# Patient Record
Sex: FEMALE | Race: WHITE | NOT HISPANIC OR LATINO | URBAN - METROPOLITAN AREA
[De-identification: names, ages, dates, MRNs, and addresses within clinical notes are randomized per-mention and may not be internally consistent; named-entity substitution may affect disease eponyms.]

---

## 2021-08-02 ENCOUNTER — OFFICE VISIT (OUTPATIENT)
Dept: URBAN - METROPOLITAN AREA CLINIC 113 | Facility: CLINIC | Age: 36
End: 2021-08-02
Payer: COMMERCIAL

## 2021-08-02 ENCOUNTER — WEB ENCOUNTER (OUTPATIENT)
Dept: URBAN - METROPOLITAN AREA CLINIC 113 | Facility: CLINIC | Age: 36
End: 2021-08-02

## 2021-08-02 VITALS
BODY MASS INDEX: 21.9 KG/M2 | HEART RATE: 84 BPM | SYSTOLIC BLOOD PRESSURE: 127 MMHG | HEIGHT: 61 IN | DIASTOLIC BLOOD PRESSURE: 79 MMHG | WEIGHT: 116 LBS | TEMPERATURE: 98.4 F

## 2021-08-02 DIAGNOSIS — R93.2 ABNORMAL CT OF LIVER: ICD-10-CM

## 2021-08-02 DIAGNOSIS — R10.13 EPIGASTRIC PAIN: ICD-10-CM

## 2021-08-02 PROCEDURE — 99203 OFFICE O/P NEW LOW 30 MIN: CPT | Performed by: INTERNAL MEDICINE

## 2021-08-02 RX ORDER — OMEPRAZOLE 20 MG/1
1 CAPSULE 30 MINUTES BEFORE MORNING MEAL CAPSULE, DELAYED RELEASE ORAL ONCE A DAY
Status: ACTIVE | COMMUNITY

## 2021-08-02 RX ORDER — OMEPRAZOLE 20 MG/1
1 CAPSULE 30 MINUTES BEFORE MORNING MEAL CAPSULE, DELAYED RELEASE ORAL ONCE A DAY
OUTPATIENT

## 2021-08-02 NOTE — HPI-TODAY'S VISIT:
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 She reports last Tuesday developing acute onset abdominal pain at 9 AM while at work.  The pain was periumbilical, knifelike, constant.  The pain resulted in nausea and episode of nonbloody emesis.  She denies any fevers, chills, change in bowel habits. Due to worsening symptoms she presented to the [facility] emergency department for evaluation.  CT abdomen pelvis with contrast on 7/27/2021 reported no acute findings, nondilated, fluid-filled loops of small bowel questionably secondary to enteritis, hepatic steatosis, hepatic periportal edema and a small amount of pericholecystic edema, 9 mm right hepatic lobe cyst.  An abdominal ultrasound was unremarkable.  Labs showed normal CBC, basic metabolic panel, liver function tests, lipase and nonreactive hepatitis C.  She was discharged to home with recommendations to begin omeprazole 20 mg daily for suspected gastritis, tramadol 50 mg every 6 hours as needed for relief of exacerbations of pain, and ondansetron 4 mg every 8 hours as needed for nausea. She reports that her pain has resolved.  She denies any nausea, vomiting, dysphagia.  She occasionally has some constipation with red blood on the tissue paper.  She reports having a colonoscopy by Dr. Cordon several years ago at [facility] that was reportedly unremarkable except for internal hemorrhoids. She is concerned about the findings on the CT.  There is no family history of liver disease.  She has never had an EGD.

## 2021-08-06 ENCOUNTER — LAB OUTSIDE AN ENCOUNTER (OUTPATIENT)
Dept: URBAN - METROPOLITAN AREA CLINIC 113 | Facility: CLINIC | Age: 36
End: 2021-08-06

## 2021-08-06 ENCOUNTER — TELEPHONE ENCOUNTER (OUTPATIENT)
Dept: URBAN - METROPOLITAN AREA CLINIC 113 | Facility: CLINIC | Age: 36
End: 2021-08-06

## 2021-08-06 PROBLEM — 15634181000119107: Status: ACTIVE | Noted: 2021-08-02

## 2021-08-21 ENCOUNTER — CLAIMS CREATED FROM THE CLAIM WINDOW (OUTPATIENT)
Dept: URBAN - METROPOLITAN AREA MEDICAL CENTER 19 | Facility: MEDICAL CENTER | Age: 36
End: 2021-08-21
Payer: COMMERCIAL

## 2021-08-21 DIAGNOSIS — R63.4 WEIGHT LOSS: ICD-10-CM

## 2021-08-21 DIAGNOSIS — R10.13 EPIGASTRIC PAIN: ICD-10-CM

## 2021-08-21 PROCEDURE — 99254 IP/OBS CNSLTJ NEW/EST MOD 60: CPT | Performed by: INTERNAL MEDICINE

## 2021-08-22 ENCOUNTER — CLAIMS CREATED FROM THE CLAIM WINDOW (OUTPATIENT)
Dept: URBAN - METROPOLITAN AREA MEDICAL CENTER 19 | Facility: MEDICAL CENTER | Age: 36
End: 2021-08-22
Payer: COMMERCIAL

## 2021-08-22 DIAGNOSIS — K29.60 OTHER GASTRITIS WITHOUT BLEEDING: ICD-10-CM

## 2021-08-22 DIAGNOSIS — R10.13 EPIGASTRIC PAIN: ICD-10-CM

## 2021-08-22 PROCEDURE — 43239 EGD BIOPSY SINGLE/MULTIPLE: CPT | Performed by: INTERNAL MEDICINE

## 2021-08-23 ENCOUNTER — CLAIMS CREATED FROM THE CLAIM WINDOW (OUTPATIENT)
Dept: URBAN - METROPOLITAN AREA MEDICAL CENTER 19 | Facility: MEDICAL CENTER | Age: 36
End: 2021-08-23
Payer: COMMERCIAL

## 2021-08-23 DIAGNOSIS — K29.60 OTHER GASTRITIS WITHOUT BLEEDING: ICD-10-CM

## 2021-08-23 DIAGNOSIS — R10.13 EPIGASTRIC PAIN: ICD-10-CM

## 2021-08-23 PROCEDURE — 99232 SBSQ HOSP IP/OBS MODERATE 35: CPT | Performed by: INTERNAL MEDICINE

## 2021-08-24 ENCOUNTER — WEB ENCOUNTER (OUTPATIENT)
Dept: URBAN - METROPOLITAN AREA CLINIC 113 | Facility: CLINIC | Age: 36
End: 2021-08-24

## 2021-08-25 ENCOUNTER — TELEPHONE ENCOUNTER (OUTPATIENT)
Dept: URBAN - METROPOLITAN AREA CLINIC 113 | Facility: CLINIC | Age: 36
End: 2021-08-25

## 2021-09-30 ENCOUNTER — OFFICE VISIT (OUTPATIENT)
Dept: URBAN - METROPOLITAN AREA CLINIC 107 | Facility: CLINIC | Age: 36
End: 2021-09-30
Payer: COMMERCIAL

## 2021-09-30 ENCOUNTER — DASHBOARD ENCOUNTERS (OUTPATIENT)
Age: 36
End: 2021-09-30

## 2021-09-30 VITALS
HEIGHT: 61 IN | DIASTOLIC BLOOD PRESSURE: 85 MMHG | BODY MASS INDEX: 20.96 KG/M2 | SYSTOLIC BLOOD PRESSURE: 120 MMHG | TEMPERATURE: 97.7 F | HEART RATE: 74 BPM | WEIGHT: 111 LBS

## 2021-09-30 DIAGNOSIS — K29.50 CHRONIC GASTRITIS WITHOUT BLEEDING, UNSPECIFIED GASTRITIS TYPE: ICD-10-CM

## 2021-09-30 DIAGNOSIS — R10.13 EPIGASTRIC PAIN: ICD-10-CM

## 2021-09-30 PROCEDURE — 99213 OFFICE O/P EST LOW 20 MIN: CPT | Performed by: INTERNAL MEDICINE

## 2021-09-30 RX ORDER — PANTOPRAZOLE SODIUM 40 MG/1
1 TABLET TABLET, DELAYED RELEASE ORAL ONCE A DAY
Qty: 30 | Refills: 3

## 2021-09-30 RX ORDER — PANTOPRAZOLE SODIUM 40 MG/1
1 TABLET TABLET, DELAYED RELEASE ORAL ONCE A DAY
Status: ACTIVE | COMMUNITY

## 2021-09-30 NOTE — HPI-OTHER HISTORIES
CT abdomen pelvis with contrast on 7/27/2021 reported no acute findings, nondilated, fluid-filled loops of small bowel questionably secondary to enteritis, hepatic steatosis, hepatic periportal edema and a small amount of pericholecystic edema, 9 mm right hepatic lobe cyst.  An abdominal ultrasound was unremarkable.   Labs showed normal CBC, basic metabolic panel, liver function tests, lipase and nonreactive hepatitis C.

## 2021-09-30 NOTE — HPI-TODAY'S VISIT:
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  She was seen in consultation by Dr. Escalera on 8/21/2021.  An EGD on 8/22/2021 showed normal esophagus, gastric erythema status post biopsy and normal duodenum.  Gastric biopsy showed mild chronic inflammation with no mention about H. pylori.  Labs on 8/23/2021 showed normal CBC, normal basic metabolic panel, normal liver function tests, normal lipase.  CT abdomen pelvis with contrast showed no acute findings and a stable right ovarian cyst measuring 3.1 x 2.9 x 4.5 cm. Since her last hospitalization, she has been doing well with resolution of the epigastric abdominal pain.  She currently denies any nausea, vomiting, heartburn or difficulty swallowing.  Her bowel habits are regular normal consistency.  No melena or red blood per rectum.

## 2021-10-10 PROBLEM — 8493009: Status: ACTIVE | Noted: 2021-09-30

## 2021-10-10 PROBLEM — 79922009: Status: ACTIVE | Noted: 2021-08-02

## 2021-11-15 ENCOUNTER — OFFICE VISIT (OUTPATIENT)
Dept: URBAN - METROPOLITAN AREA CLINIC 107 | Facility: CLINIC | Age: 36
End: 2021-11-15

## 2024-05-07 NOTE — PHYSICAL EXAM CONSTITUTIONAL:
pleasant, well nourished, well developed, in no acute distress , normal communication ability vac assisted c/s